# Patient Record
Sex: FEMALE | Race: WHITE
[De-identification: names, ages, dates, MRNs, and addresses within clinical notes are randomized per-mention and may not be internally consistent; named-entity substitution may affect disease eponyms.]

---

## 2018-02-24 ENCOUNTER — HOSPITAL ENCOUNTER (EMERGENCY)
Dept: HOSPITAL 25 - UCCORT | Age: 23
Discharge: HOME | End: 2018-02-24
Payer: COMMERCIAL

## 2018-02-24 VITALS — DIASTOLIC BLOOD PRESSURE: 62 MMHG | SYSTOLIC BLOOD PRESSURE: 118 MMHG

## 2018-02-24 DIAGNOSIS — H66.91: ICD-10-CM

## 2018-02-24 DIAGNOSIS — J45.909: Primary | ICD-10-CM

## 2018-02-24 PROCEDURE — 99212 OFFICE O/P EST SF 10 MIN: CPT

## 2018-02-24 PROCEDURE — G0463 HOSPITAL OUTPT CLINIC VISIT: HCPCS

## 2018-02-24 NOTE — UC
Ear Complaint HPI





- HPI Summary


HPI Summary: 





Patient presents with right ear and throat pain, increased cough, patient does 

have asthma





- History of Current Complaint


Chief Complaint: UCGeneralIllness


Stated Complaint: RESPIRATORY


Time Seen by Provider: 02/24/18 14:14


Hx Obtained From: Patient


Hx Last Menstrual Period: 2/6/18


Pregnant?: No


Onset/Duration: Sudden Onset, Lasting Days


Severity Initially: Moderate


Severity Currently: Moderate


Pain Intensity: 6





- Allergies/Home Medications


Allergies/Adverse Reactions: 


 Allergies











Allergy/AdvReac Type Severity Reaction Status Date / Time


 


No Known Allergies Allergy   Verified 02/24/18 14:18














PMH/Surg Hx/FS Hx/Imm Hx


Previously Healthy: Yes





- Surgical History


Surgical History: None





- Family History


Known Family History: 


   Negative: Cardiac Disease, Hypertension, Diabetes





- Social History


Alcohol Use: Rare


Substance Use Type: None


Smoking Status (MU): Never Smoked Tobacco





- Immunization History


Most Recent Influenza Vaccination: none


Vaccination Up to Date: Yes





Review of Systems


Constitutional: Negative


Skin: Negative


Eyes: Negative


ENT: Sore Throat, Ear Ache


Respiratory: Shortness Of Breath, Cough


Cardiovascular: Negative


Gastrointestinal: Negative


Genitourinary: Negative


Motor: Negative


Neurovascular: Negative


Musculoskeletal: Negative


Neurological: Negative


Psychological: Negative


Is Patient Immunocompromised?: No


All Other Systems Reviewed And Are Negative: Yes





Physical Exam


Triage Information Reviewed: Yes


Appearance: Well-Appearing, Well-Nourished, Pain Distress


Vital Signs: 


 Initial Vital Signs











Temp  98.6 F   02/24/18 14:14


 


Pulse  64   02/24/18 14:14


 


Resp  18   02/24/18 14:14


 


BP  118/62   02/24/18 14:14


 


Pulse Ox  100   02/24/18 14:14











Vital Signs Reviewed: Yes


Eye Exam: Normal


ENT: Positive: Pharyngeal erythema, TM bulging - right ear, TM dull, TM red


Dental Exam: Normal


Neck exam: Normal


Neck: Positive: Supple, Nontender, Enlarged Nodes @ - right cervical


Respiratory Exam: Normal


Respiratory: Positive: Chest non-tender, Lungs clear, Normal breath sounds


Cardiovascular Exam: Normal


Cardiovascular: Positive: RRR, No Murmur, Pulses Normal


Abdominal Exam: Normal


Abdomen Description: Positive: Nontender, No Organomegaly, Soft


Bowel Sounds: Positive: Present


Musculoskeletal Exam: Normal


Neurological Exam: Normal


Psychological Exam: Normal


Skin Exam: Normal





Ear Complaint Course/Dx





- Course


Course Of Treatment: hx obtained, exam performed ,meds reviewed, treated for 

wheezing and right otitis media





- Differential Dx/Diagnosis


Differential Diagnosis/HQI/PQRI: Otitis Externa, Otitis Media, Pharyngitis, 

Trauma


Provider Diagnoses: wheezing.  asthma.  otitis media right





Discharge





- Discharge Plan


Condition: Stable


Disposition: HOME


Prescriptions: 


Amoxicillin PO (*) [Amoxicillin 875 MG (*)] 875 mg PO BID #20 tab


predniSONE TAB* [Deltasone TAB*] 40 mg PO DAILY #14 tab


Patient Education Materials:  Ear Infection (ED)


Referrals: 


Non Staff,Doctor [Primary Care Provider] - 


Additional Instructions: 


take the medication as prescribed.


increase fluid intake and get plenty of rest


warm compresses to right ear and neck for pain as well as ibuprofen.